# Patient Record
Sex: MALE
[De-identification: names, ages, dates, MRNs, and addresses within clinical notes are randomized per-mention and may not be internally consistent; named-entity substitution may affect disease eponyms.]

---

## 2024-05-30 ENCOUNTER — APPOINTMENT (OUTPATIENT)
Dept: INTERNAL MEDICINE | Facility: CLINIC | Age: 34
End: 2024-05-30
Payer: COMMERCIAL

## 2024-05-30 ENCOUNTER — NON-APPOINTMENT (OUTPATIENT)
Age: 34
End: 2024-05-30

## 2024-05-30 ENCOUNTER — TRANSCRIPTION ENCOUNTER (OUTPATIENT)
Age: 34
End: 2024-05-30

## 2024-05-30 VITALS
BODY MASS INDEX: 19.88 KG/M2 | HEART RATE: 47 BPM | TEMPERATURE: 97.5 F | HEIGHT: 73 IN | WEIGHT: 150 LBS | DIASTOLIC BLOOD PRESSURE: 72 MMHG | SYSTOLIC BLOOD PRESSURE: 124 MMHG

## 2024-05-30 VITALS
WEIGHT: 150 LBS | OXYGEN SATURATION: 98 % | DIASTOLIC BLOOD PRESSURE: 86 MMHG | SYSTOLIC BLOOD PRESSURE: 138 MMHG | RESPIRATION RATE: 16 BRPM | HEIGHT: 73 IN | BODY MASS INDEX: 19.88 KG/M2 | HEART RATE: 43 BPM

## 2024-05-30 VITALS
SYSTOLIC BLOOD PRESSURE: 133 MMHG | BODY MASS INDEX: 19.88 KG/M2 | OXYGEN SATURATION: 98 % | HEART RATE: 43 BPM | HEIGHT: 73 IN | WEIGHT: 150 LBS | DIASTOLIC BLOOD PRESSURE: 61 MMHG | RESPIRATION RATE: 16 BRPM

## 2024-05-30 DIAGNOSIS — Z86.69 PERSONAL HISTORY OF OTHER DISEASES OF THE NERVOUS SYSTEM AND SENSE ORGANS: ICD-10-CM

## 2024-05-30 DIAGNOSIS — Z82.3 FAMILY HISTORY OF STROKE: ICD-10-CM

## 2024-05-30 DIAGNOSIS — F17.200 NICOTINE DEPENDENCE, UNSPECIFIED, UNCOMPLICATED: ICD-10-CM

## 2024-05-30 DIAGNOSIS — Z86.59 PERSONAL HISTORY OF OTHER MENTAL AND BEHAVIORAL DISORDERS: ICD-10-CM

## 2024-05-30 DIAGNOSIS — Z83.3 FAMILY HISTORY OF DIABETES MELLITUS: ICD-10-CM

## 2024-05-30 DIAGNOSIS — Z87.19 PERSONAL HISTORY OF OTHER DISEASES OF THE DIGESTIVE SYSTEM: ICD-10-CM

## 2024-05-30 DIAGNOSIS — R40.20 UNSPECIFIED COMA: ICD-10-CM

## 2024-05-30 PROBLEM — Z00.00 ENCOUNTER FOR PREVENTIVE HEALTH EXAMINATION: Status: ACTIVE | Noted: 2024-05-30

## 2024-05-30 PROCEDURE — 99204 OFFICE O/P NEW MOD 45 MIN: CPT | Mod: 25

## 2024-05-30 PROCEDURE — G0444 DEPRESSION SCREEN ANNUAL: CPT | Mod: 59

## 2024-05-30 RX ORDER — BIMATOPROST 0.1 MG/ML
0.01 SOLUTION/ DROPS OPHTHALMIC DAILY
Refills: 0 | Status: ACTIVE | COMMUNITY

## 2024-05-30 RX ORDER — BRIMONIDINE TARTRATE, TIMOLOL MALEATE 2; 5 MG/ML; MG/ML
0.2-0.5 SOLUTION/ DROPS OPHTHALMIC TWICE DAILY
Refills: 0 | Status: ACTIVE | COMMUNITY

## 2024-05-30 NOTE — PLAN
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

## 2024-05-31 ENCOUNTER — TRANSCRIPTION ENCOUNTER (OUTPATIENT)
Age: 34
End: 2024-05-31

## 2024-05-31 LAB
ALBUMIN SERPL ELPH-MCNC: 4.8 G/DL
ALP BLD-CCNC: 72 U/L
ALT SERPL-CCNC: 22 U/L
ANION GAP SERPL CALC-SCNC: 12 MMOL/L
AST SERPL-CCNC: 16 U/L
BILIRUB SERPL-MCNC: 0.2 MG/DL
BUN SERPL-MCNC: 13 MG/DL
C TRACH RRNA SPEC QL NAA+PROBE: NOT DETECTED
CALCIUM SERPL-MCNC: 10.2 MG/DL
CHLORIDE SERPL-SCNC: 105 MMOL/L
CHOLEST SERPL-MCNC: 170 MG/DL
CO2 SERPL-SCNC: 26 MMOL/L
CREAT SERPL-MCNC: 0.96 MG/DL
EGFR: 108 ML/MIN/1.73M2
ESTIMATED AVERAGE GLUCOSE: 105 MG/DL
GLUCOSE SERPL-MCNC: 91 MG/DL
HBA1C MFR BLD HPLC: 5.3 %
HCT VFR BLD CALC: 44.1 %
HCV AB SER QL: NONREACTIVE
HCV S/CO RATIO: 0.07 S/CO
HDLC SERPL-MCNC: 63 MG/DL
HGB BLD-MCNC: 14.3 G/DL
HIV1+2 AB SPEC QL IA.RAPID: NONREACTIVE
LDLC SERPL CALC-MCNC: 96 MG/DL
MCHC RBC-ENTMCNC: 29.8 PG
MCHC RBC-ENTMCNC: 32.4 GM/DL
MCV RBC AUTO: 91.9 FL
N GONORRHOEA RRNA SPEC QL NAA+PROBE: NOT DETECTED
NONHDLC SERPL-MCNC: 107 MG/DL
PLATELET # BLD AUTO: 260 K/UL
POTASSIUM SERPL-SCNC: 6.3 MMOL/L
PROT SERPL-MCNC: 7 G/DL
RBC # BLD: 4.8 M/UL
RBC # FLD: 12.8 %
SODIUM SERPL-SCNC: 143 MMOL/L
SOURCE AMPLIFICATION: NORMAL
TRIGL SERPL-MCNC: 53 MG/DL
WBC # FLD AUTO: 6.09 K/UL

## 2024-05-31 NOTE — PHYSICAL EXAM
[No Acute Distress] : no acute distress [Well Developed] : well developed [Well-Appearing] : well-appearing [Normal Voice/Communication] : normal voice/communication [Looks Tired] : appears tired [Normal Verbal Skills] : the patient had normal verbal communication skills [Normal Sclera/Conjunctiva] : normal sclera/conjunctiva [Normal Outer Ear/Nose] : the outer ears and nose were normal in appearance [Supple] : supple [No Respiratory Distress] : no respiratory distress  [No Accessory Muscle Use] : no accessory muscle use [Regular Rhythm] : with a regular rhythm [Normal S1, S2] : normal S1 and S2 [No Murmur] : no murmur heard [Bradycardia] : bradycardic [No Edema] : there was no peripheral edema [Soft] : abdomen soft [Non Tender] : non-tender [No Joint Swelling] : no joint swelling [No Rash] : no rash [Coordination Grossly Intact] : coordination grossly intact [No Focal Deficits] : no focal deficits [Normal Gait] : normal gait [Speech Grossly Normal] : speech grossly normal [Memory Grossly Normal] : memory grossly normal [Alert and Oriented x3] : oriented to person, place, and time [Normal Mood] : the mood was normal [Normal Insight/Judgement] : insight and judgment were intact [de-identified] : Appears thin and undernourished  [de-identified] : Mild bitemporal wasting

## 2024-05-31 NOTE — REVIEW OF SYSTEMS
[Fatigue] : fatigue [Recent Change In Weight] : ~T recent weight change [Vision Problems] : vision problems [Fever] : no fever [Chills] : no chills [Hot Flashes] : no hot flashes [Night Sweats] : no night sweats [Sore Throat] : no sore throat [Chest Pain] : no chest pain [Palpitations] : no palpitations [Claudication] : no  leg claudication [Lower Ext Edema] : no lower extremity edema [Orthopena] : no orthopnea [Paroxysmal Nocturnal Dyspnea] : no paroxysmal nocturnal dyspnea [Shortness Of Breath] : no shortness of breath [Wheezing] : no wheezing [Cough] : no cough [Dyspnea on Exertion] : not dyspnea on exertion [Abdominal Pain] : no abdominal pain [Nausea] : no nausea [Vomiting] : no vomiting [Dysuria] : no dysuria [Hematuria] : no hematuria [Joint Pain] : no joint pain [Back Pain] : no back pain [Skin Rash] : no skin rash [Headache] : no headache [Dizziness] : no dizziness [Fainting] : no fainting [Confusion] : no confusion [Unsteady Walk] : no ataxia [Memory Loss] : no memory loss [Suicidal] : not suicidal [Insomnia] : no insomnia [Depression] : no depression [Easy Bleeding] : no easy bleeding [Easy Bruising] : no easy bruising [Swollen Glands] : no swollen glands [de-identified] : No symptoms at present

## 2024-05-31 NOTE — ADDENDUM
[FreeTextEntry1] : 05/31/2024  Received critical value from lab: K 6.3 (no hemolysis) I personally called him and advised him to go to the ED He went to [facility], closest to where he was Per his report, BMP was repeated and K was in the 4s. EKG was done and he was reassured.

## 2024-05-31 NOTE — HISTORY OF PRESENT ILLNESS
[FreeTextEntry1] : Syncope [facility] maintenance [de-identified] : #Syncope (~2 nights ago) He was working in the sun x3 consecutive days this week (); he also feels during this time he was not eating and drinking well. Moreover, his brother was having a life crisis so, after work, he walked with his brother further more in the sun without eating or drinking much. He came home exhausted, sat on the couch and ordered food. Then when the food delivery bonny came, he got up, climbed a set of stairs to speak to the food delivery bonny. While speaking to him (delivery bonny was frustrating him as his jazmine wasn't working) for 10 seconds he felt nausea, lightheadedness, gradually losing vision, then he fell on the floor (this is the last moment he remembers); brother saw him, ~20-30 seconds later back to normal, he remembers that moment after waking. During the fall, he hit himself on his head (left, top), left lateral eye (broke his glasses), left elbow, right posterior mid back (bled a bit)  This has never happened before (h/o concussion in 8th grade playing sports)  He contacted his cousin who is a cardiologist who thought this was due to dehydration but recommended formal eval  He's now feeling exhausted x48 hours but the episode did not happen again No post-ictal confusion or any change in mental status He came to the clinic independently alone today Orthostatics done - negative   #Glaucoma Uses drops (see med hx) Sees ophthalmologist every 4-6 months   #Crohn's - stable, in remission Last colonoscopy 2017 Not on any medications  No recent diarrhea or bleeding per rectum  #OCD Used to be in therapy but not for ~3 years Feels may benefit from going back  Film director/writer, also does acting, 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

## 2024-05-31 NOTE — ASSESSMENT
[FreeTextEntry1] : #LOC Appears vasovagal in the setting of sun exposure for a long time and concern for undernourishment and dehydration. Less likely seizure, ACS, arrythmia. He does have bradycardia but it's sinus. Orthostatic measurement negative.  - Reassured - Advised adequate nourishment and hydration especially while working outdoors - If recurs, return to clinic for more extensive testing   #Glaucoma - C/w eye drops - C/w close ophthalmology f/u   #Crohn's  Stable off of meds, in remission Last colonoscopy 2017 - F/u with GI as needed  #OCD Used to be in therapy but not for ~3 years Feels may benefit from going back - Advised to re-start therapy  #HCM - HPV vaccine: Received - Smoking: Stop marijuana and tobacco smoking - Diet: Advised adequate nourishment with a diet optimized in protein and fiber. Suggested examples of protein bars/shakes that he can use while working as a . Advised adequate daily hydration.  - Exercise: C/w current physical activity (steps, yoga, weight lifiting) - Sleep: Sleep hygiene discussed. Optimize sleep. 7-8 hours or as appropriate for him. Consistent sleep-wake timings.

## 2024-05-31 NOTE — HEALTH RISK ASSESSMENT
[Yes] : Yes [2 - 4 times a month (2 pts)] : 2-4 times a month (2 points) [1 or 2 (0 pts)] : 1 or 2 (0 points) [Less than monthly (1 pt)] : Less than monthly (1 point) [0] : 2) Feeling down, depressed, or hopeless: Not at all (0) [Time Spent: ___ Minutes] : I spent [unfilled] minutes performing a depression screening for this patient. [Current] : Current [0-4] : 0-4 [Audit-CScore] : 3 [de-identified] : 7-8 ciggs a day x8-9 years

## 2024-05-31 NOTE — COUNSELING
[Behavioral health counseling provided] : Behavioral health counseling provided [Sleep ___ hours/day] : Sleep [unfilled] hours/day [Engage in a relaxing activity] : Engage in a relaxing activity [Plan in advance] : Plan in advance [Encouraged to pick a quit date and identify support needed to quit] : Encouraged to pick a quit date and identify support needed to quit [Yes] : Willing to quit smoking [FreeTextEntry1] : 4

## 2024-08-21 ENCOUNTER — APPOINTMENT (OUTPATIENT)
Dept: INTERNAL MEDICINE | Facility: CLINIC | Age: 34
End: 2024-08-21
Payer: COMMERCIAL

## 2024-08-21 PROCEDURE — 99212 OFFICE O/P EST SF 10 MIN: CPT

## 2024-08-21 NOTE — HISTORY OF PRESENT ILLNESS
[Home] : at home, [unfilled] , at the time of the visit. [Medical Office: ([facility])___] : at the medical office located in  [Verbal consent obtained from patient] : the patient, [unfilled] [FreeTextEntry1] : Tej first presented to me on 05/30/24 with recent history of LOC He's follow up today [de-identified] : #Hyperkalemia Labs last time significant for K 6.3 (no hemolysis) He went to [facility] ED, closest to where he was Per his report, BMP was repeated and K was in the 4s. EKG was done and he was reassured.  #Syncope (in May 2024) His syncope according to his history appeared vasovagal to me in the setting of prolonged work hours while standing in the sun and limited hydration/nutrition Since then, no episode of LOC  He's doing well at work but would like to change his job as a    #Glaucoma - Uses drops (see med hx) - Sees ophthalmologist every 4-6 months  #Crohn's - stable, in remission - Last colonoscopy 2017 - Not on any medications - No recent diarrhea or bleeding per rectum  #OCD - Used to be in therapy but not for ~3 years  #HCM - Vaccines: HPV - received / Likely needs Tdap / Advised to also come to clinic for Flu/COVID vaccines once we start stocking

## 2024-08-21 NOTE — REVIEW OF SYSTEMS
[Chest Pain] : no chest pain [Palpitations] : no palpitations [Lower Ext Edema] : no lower extremity edema [Shortness Of Breath] : no shortness of breath [Dyspnea on Exertion] : not dyspnea on exertion [Headache] : no headache [Dizziness] : no dizziness [Fainting] : no fainting [Confusion] : no confusion [Unsteady Walk] : no ataxia [Memory Loss] : no memory loss

## 2024-08-21 NOTE — HISTORY OF PRESENT ILLNESS
[Home] : at home, [unfilled] , at the time of the visit. [Medical Office: ([facility])___] : at the medical office located in  [Verbal consent obtained from patient] : the patient, [unfilled] [FreeTextEntry1] : Tej first presented to me on 05/30/24 with recent history of LOC He's follow up today [de-identified] : #Hyperkalemia Labs last time significant for K 6.3 (no hemolysis) He went to [facility] ED, closest to where he was Per his report, BMP was repeated and K was in the 4s. EKG was done and he was reassured.  #Syncope (in May 2024) His syncope according to his history appeared vasovagal to me in the setting of prolonged work hours while standing in the sun and limited hydration/nutrition Since then, no episode of LOC  He's doing well at work but would like to change his job as a    #Glaucoma - Uses drops (see med hx) - Sees ophthalmologist every 4-6 months  #Crohn's - stable, in remission - Last colonoscopy 2017 - Not on any medications - No recent diarrhea or bleeding per rectum  #OCD - Used to be in therapy but not for ~3 years  #HCM - Vaccines: HPV - received / Likely needs Tdap / Advised to also come to clinic for Flu/COVID vaccines once we start stocking

## 2024-08-21 NOTE — PHYSICAL EXAM
[No Acute Distress] : no acute distress [Well Nourished] : well nourished [Well Developed] : well developed [Well-Appearing] : well-appearing [Normal Voice/Communication] : normal voice/communication [Normal Sclera/Conjunctiva] : normal sclera/conjunctiva [EOMI] : extraocular movements intact [Supple] : supple [No Respiratory Distress] : no respiratory distress  [Normal] : affect was normal and insight and judgment were intact

## 2024-08-21 NOTE — PLAN
[FreeTextEntry1] : Return to clinic for Tdap, Flu, and COVID vaccines At that time, will check BMP one more time to ensure K remains normal